# Patient Record
Sex: MALE | Race: BLACK OR AFRICAN AMERICAN | NOT HISPANIC OR LATINO | URBAN - METROPOLITAN AREA
[De-identification: names, ages, dates, MRNs, and addresses within clinical notes are randomized per-mention and may not be internally consistent; named-entity substitution may affect disease eponyms.]

---
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
## 2024-05-22 VITALS
HEART RATE: 125 BPM | TEMPERATURE: 99 F | RESPIRATION RATE: 18 BRPM | OXYGEN SATURATION: 99 % | DIASTOLIC BLOOD PRESSURE: 71 MMHG | HEIGHT: 71 IN | WEIGHT: 214.95 LBS | SYSTOLIC BLOOD PRESSURE: 119 MMHG

## 2024-05-22 LAB
AMPHET UR-MCNC: NEGATIVE — SIGNIFICANT CHANGE UP
ANION GAP SERPL CALC-SCNC: 13 MMOL/L — SIGNIFICANT CHANGE UP (ref 5–17)
APAP SERPL-MCNC: <5 UG/ML — LOW (ref 10–30)
APPEARANCE UR: CLEAR — SIGNIFICANT CHANGE UP
BACTERIA # UR AUTO: NEGATIVE /HPF — SIGNIFICANT CHANGE UP
BARBITURATES UR SCN-MCNC: NEGATIVE — SIGNIFICANT CHANGE UP
BASOPHILS # BLD AUTO: 0.03 K/UL — SIGNIFICANT CHANGE UP (ref 0–0.2)
BASOPHILS NFR BLD AUTO: 0.4 % — SIGNIFICANT CHANGE UP (ref 0–2)
BENZODIAZ UR-MCNC: NEGATIVE — SIGNIFICANT CHANGE UP
BILIRUB UR-MCNC: NEGATIVE — SIGNIFICANT CHANGE UP
BUN SERPL-MCNC: 14 MG/DL — SIGNIFICANT CHANGE UP (ref 7–23)
CALCIUM SERPL-MCNC: 9.7 MG/DL — SIGNIFICANT CHANGE UP (ref 8.4–10.5)
CAST: 0 /LPF — SIGNIFICANT CHANGE UP (ref 0–4)
CHLORIDE SERPL-SCNC: 102 MMOL/L — SIGNIFICANT CHANGE UP (ref 96–108)
CO2 SERPL-SCNC: 23 MMOL/L — SIGNIFICANT CHANGE UP (ref 22–31)
COCAINE METAB.OTHER UR-MCNC: NEGATIVE — SIGNIFICANT CHANGE UP
COLOR SPEC: YELLOW — SIGNIFICANT CHANGE UP
CREAT SERPL-MCNC: 0.99 MG/DL — SIGNIFICANT CHANGE UP (ref 0.5–1.3)
DIFF PNL FLD: NEGATIVE — SIGNIFICANT CHANGE UP
EGFR: 113 ML/MIN/1.73M2 — SIGNIFICANT CHANGE UP
EOSINOPHIL # BLD AUTO: 0.15 K/UL — SIGNIFICANT CHANGE UP (ref 0–0.5)
EOSINOPHIL NFR BLD AUTO: 2.2 % — SIGNIFICANT CHANGE UP (ref 0–6)
ETHANOL SERPL-MCNC: <10 MG/DL — SIGNIFICANT CHANGE UP (ref 0–10)
FLUAV AG NPH QL: SIGNIFICANT CHANGE UP
FLUBV AG NPH QL: SIGNIFICANT CHANGE UP
GLUCOSE SERPL-MCNC: 113 MG/DL — HIGH (ref 70–99)
GLUCOSE UR QL: NEGATIVE MG/DL — SIGNIFICANT CHANGE UP
HCT VFR BLD CALC: 42.5 % — SIGNIFICANT CHANGE UP (ref 39–50)
HGB BLD-MCNC: 14.2 G/DL — SIGNIFICANT CHANGE UP (ref 13–17)
IMM GRANULOCYTES NFR BLD AUTO: 0.4 % — SIGNIFICANT CHANGE UP (ref 0–0.9)
KETONES UR-MCNC: NEGATIVE MG/DL — SIGNIFICANT CHANGE UP
LEUKOCYTE ESTERASE UR-ACNC: ABNORMAL
LYMPHOCYTES # BLD AUTO: 1.8 K/UL — SIGNIFICANT CHANGE UP (ref 1–3.3)
LYMPHOCYTES # BLD AUTO: 26.6 % — SIGNIFICANT CHANGE UP (ref 13–44)
MCHC RBC-ENTMCNC: 27.1 PG — SIGNIFICANT CHANGE UP (ref 27–34)
MCHC RBC-ENTMCNC: 33.4 GM/DL — SIGNIFICANT CHANGE UP (ref 32–36)
MCV RBC AUTO: 81.1 FL — SIGNIFICANT CHANGE UP (ref 80–100)
METHADONE UR-MCNC: NEGATIVE — SIGNIFICANT CHANGE UP
MONOCYTES # BLD AUTO: 1.1 K/UL — HIGH (ref 0–0.9)
MONOCYTES NFR BLD AUTO: 16.3 % — HIGH (ref 2–14)
NEUTROPHILS # BLD AUTO: 3.65 K/UL — SIGNIFICANT CHANGE UP (ref 1.8–7.4)
NEUTROPHILS NFR BLD AUTO: 54.1 % — SIGNIFICANT CHANGE UP (ref 43–77)
NITRITE UR-MCNC: NEGATIVE — SIGNIFICANT CHANGE UP
NRBC # BLD: 0 /100 WBCS — SIGNIFICANT CHANGE UP (ref 0–0)
OPIATES UR-MCNC: NEGATIVE — SIGNIFICANT CHANGE UP
PCP SPEC-MCNC: SIGNIFICANT CHANGE UP
PCP UR-MCNC: NEGATIVE — SIGNIFICANT CHANGE UP
PH UR: 7 — SIGNIFICANT CHANGE UP (ref 5–8)
PLATELET # BLD AUTO: 215 K/UL — SIGNIFICANT CHANGE UP (ref 150–400)
POTASSIUM SERPL-MCNC: 4.3 MMOL/L — SIGNIFICANT CHANGE UP (ref 3.5–5.3)
POTASSIUM SERPL-SCNC: 4.3 MMOL/L — SIGNIFICANT CHANGE UP (ref 3.5–5.3)
PROT UR-MCNC: SIGNIFICANT CHANGE UP MG/DL
RBC # BLD: 5.24 M/UL — SIGNIFICANT CHANGE UP (ref 4.2–5.8)
RBC # FLD: 14.1 % — SIGNIFICANT CHANGE UP (ref 10.3–14.5)
RBC CASTS # UR COMP ASSIST: 1 /HPF — SIGNIFICANT CHANGE UP (ref 0–4)
RSV RNA NPH QL NAA+NON-PROBE: SIGNIFICANT CHANGE UP
SALICYLATES SERPL-MCNC: <0.3 MG/DL — LOW (ref 2.8–20)
SARS-COV-2 RNA SPEC QL NAA+PROBE: SIGNIFICANT CHANGE UP
SODIUM SERPL-SCNC: 138 MMOL/L — SIGNIFICANT CHANGE UP (ref 135–145)
SP GR SPEC: 1.03 — SIGNIFICANT CHANGE UP (ref 1–1.03)
SQUAMOUS # UR AUTO: 0 /HPF — SIGNIFICANT CHANGE UP (ref 0–5)
THC UR QL: NEGATIVE — SIGNIFICANT CHANGE UP
UROBILINOGEN FLD QL: 1 MG/DL — SIGNIFICANT CHANGE UP (ref 0.2–1)
WBC # BLD: 6.76 K/UL — SIGNIFICANT CHANGE UP (ref 3.8–10.5)
WBC # FLD AUTO: 6.76 K/UL — SIGNIFICANT CHANGE UP (ref 3.8–10.5)
WBC UR QL: 5 /HPF — SIGNIFICANT CHANGE UP (ref 0–5)

## 2024-05-22 PROCEDURE — 99285 EMERGENCY DEPT VISIT HI MDM: CPT

## 2024-05-22 NOTE — ED ADULT NURSE REASSESSMENT NOTE - NS ED NURSE REASSESS COMMENT FT1
Patient placed on 1:1 observation at all times in full view and at arm's length, including while the patient is in the bathroom, and whenever the patient leaves the unit for diagnostic testing and transfers.

## 2024-05-22 NOTE — ED ADULT NURSE NOTE - OBJECTIVE STATEMENT
Next appt 7-27-21. Pt presenting to ED with +SI without a plan x1 month, pt denies HI. Pt c/o paranoia, depression and is unable to sleep d/t SI. Pt denies alcohol/drug use, pt denies any PMH. Pt placed on 1:1 observation.

## 2024-05-23 ENCOUNTER — INPATIENT (INPATIENT)
Facility: HOSPITAL | Age: 19
LOS: 7 days | Discharge: HOME | End: 2024-05-31
Attending: PSYCHIATRY & NEUROLOGY | Admitting: PSYCHIATRY & NEUROLOGY
Payer: COMMERCIAL

## 2024-05-23 DIAGNOSIS — F43.20 ADJUSTMENT DISORDER, UNSPECIFIED: ICD-10-CM

## 2024-05-23 DIAGNOSIS — F29 UNSPECIFIED PSYCHOSIS NOT DUE TO A SUBSTANCE OR KNOWN PHYSIOLOGICAL CONDITION: ICD-10-CM

## 2024-05-23 DIAGNOSIS — F41.9 ANXIETY DISORDER, UNSPECIFIED: ICD-10-CM

## 2024-05-23 LAB — SARS-COV-2 RNA SPEC QL NAA+PROBE: SIGNIFICANT CHANGE UP

## 2024-05-23 PROCEDURE — 90792 PSYCH DIAG EVAL W/MED SRVCS: CPT | Mod: 95

## 2024-05-23 PROCEDURE — 99223 1ST HOSP IP/OBS HIGH 75: CPT

## 2024-05-23 RX ORDER — HALOPERIDOL DECANOATE 100 MG/ML
5 INJECTION INTRAMUSCULAR EVERY 6 HOURS
Refills: 0 | Status: DISCONTINUED | OUTPATIENT
Start: 2024-05-23 | End: 2024-05-31

## 2024-05-23 RX ORDER — RISPERIDONE 4 MG/1
1 TABLET ORAL AT BEDTIME
Refills: 0 | Status: DISCONTINUED | OUTPATIENT
Start: 2024-05-23 | End: 2024-05-31

## 2024-05-23 RX ORDER — DIPHENHYDRAMINE HCL 50 MG
50 CAPSULE ORAL EVERY 6 HOURS
Refills: 0 | Status: DISCONTINUED | OUTPATIENT
Start: 2024-05-23 | End: 2024-05-31

## 2024-05-23 RX ORDER — ACETAMINOPHEN 500 MG
650 TABLET ORAL EVERY 6 HOURS
Refills: 0 | Status: DISCONTINUED | OUTPATIENT
Start: 2024-05-23 | End: 2024-05-31

## 2024-05-23 NOTE — ED BEHAVIORAL HEALTH ASSESSMENT NOTE - DIFFERENTIAL
Adjustment Disorder, Generalized Anxiety Disorder, Primary psychosis, major depressive disorder, PTSD

## 2024-05-23 NOTE — ED BEHAVIORAL HEALTH ASSESSMENT NOTE - PSYCHIATRIC ISSUES AND PLAN (INCLUDE STANDING AND PRN MEDICATION)
Consider starting SSRI after getting diagnostic clarity; PRNS: Haldol 5/Ativan 2/Bendaryl 50 q6 hours po/IM for agitation/severe agitation

## 2024-05-23 NOTE — ED PROVIDER NOTE - OBJECTIVE STATEMENT
18M psych hx, c/o feeling paranoid. pt states ongoing for past month. states also having auditory hallucinations at night. no SI. no n/v. pt states he is supposed to take a medication, but cannot recall the name. denies drugs/alcohol. states has been admitted to psych facility in the past, however not here.

## 2024-05-23 NOTE — BH INPATIENT PSYCHIATRY ASSESSMENT NOTE - NSBHATTESTAPPBILLTIME_PSY_A_CORE
I attest my time as MAURA is greater than 50% of the total combined time spent on qualifying patient care activities. I have reviewed and verified the documentation.

## 2024-05-23 NOTE — BH INPATIENT PSYCHIATRY ASSESSMENT NOTE - NSBHASSESSSUMMFT_PSY_ALL_CORE
This is a 19y/o unemployed, single, undomiciled (staying in a drop in center in [facility])  male. No pertinent medical hx. Psychiatric hx of multiple hospitalizations at [facility] (9/16/19 - 9/1/19, 11/28/23 - 12/21/23, 12/27/23 - 12/31/23), most recently hospitalized January 2024. Diagnoses include PTSD, Anxiety, DMDD, MDD, Schizophrenia, ADHD. No hx of SA, no NSSIB. +Hx trauma, no substance use. Patient self-presented to [facility] ED with complaint of paranoid ideation and anxiety. Utox negative for all substances. Patient admitted to [facility] voluntary status.   He reports worsening paranoid ideation and anxiety over the last few months in context of medication non-compliance and psychosocial stressors. He has lack of support in the community and recently transitioned from a residential program to a drop in center. Will intend to start Risperdal and gauge need for antidepressant.      Risperdal 1mg qhs

## 2024-05-23 NOTE — BH SOCIAL WORK INITIAL PSYCHOSOCIAL EVALUATION - NSBHHOUSECONTACTFT_PSY_ALL_CORE
"[facility]"  [address] [address]  Clinical Supervisor: Melissa Kelly (Work Cell: [phone])  Main Phone: [phone]

## 2024-05-23 NOTE — ED BEHAVIORAL HEALTH ASSESSMENT NOTE - DESCRIPTION
see HPI denies Pt calm and cooperative, in good behavioral control     T(C): 36.7 (05-23-24 @ 03:33)  T(F): 98 (05-23-24 @ 03:33), Max: 98.6 (05-22-24 @ 20:55)  HR: 96 (05-23-24 @ 04:07) (96 - 125)  BP: 115/75 (05-23-24 @ 03:33) (110/72 - 119/71)  RR:  (16 - 18)  SpO2:  (97% - 99%)  Wt(kg): --

## 2024-05-23 NOTE — ED PROVIDER NOTE - CLINICAL SUMMARY MEDICAL DECISION MAKING FREE TEXT BOX
c/o feeling paranoid, AH, no SI currently, quiet, slow to answer  -check labs  -ekg  -1:1  -psych c/s c/o feeling paranoid, AH, no SI currently, quiet, slow to answer. initially tachycardic in triage, improved without intervention. no chest pain, no SOB. afebrile  -check labs  -ekg  -1:1  -psych c/s

## 2024-05-23 NOTE — BH SOCIAL WORK INITIAL PSYCHOSOCIAL EVALUATION - NSBHCHILDEVENTS_PSY_ALL_CORE
The patient reports that he was removed from his parents care during his childhood, and resided in foster care group homes. His parents presently reside in [place] with his younger sisters./Out of home placement

## 2024-05-23 NOTE — BH SOCIAL WORK INITIAL PSYCHOSOCIAL EVALUATION - NSBHCOMMCURRENT_PSY_ALL_CORE
The patient reports that he has a , Teresa, who works at the [facility] that he has been sleeping at the past 3 months. This SW called his Drop-In Center on 5/23/24, and spoke with the Supervisor for Clinical Services, Melissa Kelly (Phone: [phone]), who stated Teresa is their Financial Counselor. She reports the patient opens up to Teresa more than to their SW team. No complaints

## 2024-05-23 NOTE — BH INPATIENT PSYCHIATRY ASSESSMENT NOTE - NSBHCHARTREVIEWVS_PSY_A_CORE FT
Vital Signs Last 24 Hrs  T(C): 37.4 (05-23-24 @ 05:10), Max: 37.4 (05-23-24 @ 05:10)  T(F): 99.4 (05-23-24 @ 05:10), Max: 99.4 (05-23-24 @ 05:10)  HR: 100 (05-23-24 @ 05:10) (96 - 125)  BP: 120/76 (05-23-24 @ 05:10) (110/72 - 120/76)  BP(mean): --  RR: 18 (05-23-24 @ 05:10) (16 - 18)  SpO2: 97% (05-23-24 @ 05:10) (97% - 99%)

## 2024-05-23 NOTE — ED BEHAVIORAL HEALTH ASSESSMENT NOTE - SUMMARY
Jose Alberto is a 18 year old, currently in foster care, multiple past IPP (last known 1/5 - 1//2024 for unspecified psychosis at an [facility] of state hospital), multiple hospitalizations at [facility] (12/27/23 - 12/31/23; 11/28/23 - 12/21/23; 9/16/19 - 9/1/19), per PSYCKES diagnoses of PTSD, anxiety, unspecified psychosis, unspecified anxiety, DMDD, major depressive disorder, schizophrenia, ADHD,  denies legal history, denies history of suicide attempts, history of trauma, no substance use, presented to the ED with paranoia and anxiety.    On evaluation the patient is anxious appearing, expresses anxiety, paranoia intermittent suicidal ideation without intent or plan, depression, and poor sleep. Pt is a poor historian.  He is dysphoric appearing and also has a history or trauma. He is reportedly in foster care without placement and has been sleeping on a couch in an office in [place] for two months. Unable to contact any collateral as patient does not have information. Given patient's ongoing symptoms, lack of social support, lack of current psychiatric treatment, multiple past IPP, and instability to safety plan, pt would benefit from inpt hospitalization for safety and stabilization and to restart psychiatric medications. Differential diagnosis is broad, and it would be beneficial to obtain more collateral or records from [facility] where patient was hospitalized numerous times for treatment purposes.  Pt agreeable with plan and will be admitted to [facility] IPP on 9.13. No CO needed at this time.

## 2024-05-23 NOTE — ED BEHAVIORAL HEALTH ASSESSMENT NOTE - HPI (INCLUDE ILLNESS QUALITY, SEVERITY, DURATION, TIMING, CONTEXT, MODIFYING FACTORS, ASSOCIATED SIGNS AND SYMPTOMS)
Jose Alberto is a 18 year old, currently in foster care, multiple past IPP (last known 1/5 - 1//2024 for unspecified psychosis at an Out of state hospital, Jose Alberto is a 18 year old, currently in foster care, multiple past IPP (last known 1/5 - 1//2024 for unspecified psychosis at an Out of state hospital), multiple hospitalizations at [facility] (12/27/23 - 12/31/23; 11/28/23 - 12/21/23; 9/16/19 - 9/1/19), per PSYCKES diagnoses of PTSD< anxiety, unspecified psychosis, unspecified anxiety, DMDD, major depressive disorder, schizophrenia, ADHD,  denies legal history, denies history of suicide attempts, history of trauma, no substance use, presented to the ED with paranoia and anxiety.     The patient is a poor historian. He is calm and cooperative on evaluation. He reports that he came to the ED because he has been feeling paranoid and restless. He states that he feels like he has to be on the defensive and people are watching him and looking at him. He states that he also has bad anxiety and was on the train earlier today and experienced SOB and restlessness. He was vague regarding his social history, states that he has been in foster care for a long time; was living in a group home in [place] (and had received some outpatient treatment at [facility]), but was being bullied so his  Dev is looking for new placement. He states that he was told to sleep at a drop in center in [place] ([address] [place] [address]) until his new placement is possible. He did not have a phone number for Dev but provided his email ([email or web address]). States he has been sleeping adria a couch at the drop n center, because there is no bed and it is not an actual shelter. States he has heriberto doing this for 1-2 months. He reports history of trauma related to his childhood, and would not discuss this or the reasons why he was removed from his parents care. He stated that his parents live in [place] with his biological younger sisters. Reports feeling depressed, he is not on his medications and was previously on VPA and Risperdal. He states that he had suicidal ideation earlier when he was feeling paranoid an anxious. States that his heart was racing and it comes and goes. He denies current SI/HI. He states that he is having difficulty sleeping and feels restless. He denies auditory/visual hallucinations. Denies access to guns. Denies history of arrests/violence. States that he has no social supports and is not in touch with family. He does not receive SSI or benefits and has been getting food from drop in center. He denies substance abuse. Pt graduated from 11th grade, working on his GED. Allergies to peanuts and shellfish. Pt requesting inpatient admission, wants to restart medications.
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
## 2024-05-23 NOTE — ED BEHAVIORAL HEALTH ASSESSMENT NOTE - OTHER
In foster care, currently staying at a drop in center in  [place] till new placement paranoia, suicidal ideation, depression reports paranoia

## 2024-05-23 NOTE — BH PATIENT PROFILE - NSPROMEDSPUMP_GEN_A_NUR
none
Patient states "I need to see a psychiatrist, I had jaw surgery 4 months ago that was botched. I've been having panic attacks. Also I have been thinking about killing myself". Denies Homicidal ideations, denies plan for SI

## 2024-05-23 NOTE — ED BEHAVIORAL HEALTH ASSESSMENT NOTE - RISK ASSESSMENT
RF: previous IPP, recent suicidal ideation, depressed mood, no social supports, unstable domicile, history of trauma, paranoia    PF: help seeking, calm and cooperative, denies current SI/HI, no history of violence, no access to guns

## 2024-05-23 NOTE — BH INPATIENT PSYCHIATRY ASSESSMENT NOTE - NSBHMETABOLIC_PSY_ALL_CORE_FT
BMI: BMI (kg/m2): 30 (05-23-24 @ 05:10)  HbA1c:   Glucose:   BP: 120/76 (05-23-24 @ 05:10) (110/72 - 120/76)Vital Signs Last 24 Hrs  T(C): 37.4 (05-23-24 @ 05:10), Max: 37.4 (05-23-24 @ 05:10)  T(F): 99.4 (05-23-24 @ 05:10), Max: 99.4 (05-23-24 @ 05:10)  HR: 100 (05-23-24 @ 05:10) (96 - 125)  BP: 120/76 (05-23-24 @ 05:10) (110/72 - 120/76)  BP(mean): --  RR: 18 (05-23-24 @ 05:10) (16 - 18)  SpO2: 97% (05-23-24 @ 05:10) (97% - 99%)      Lipid Panel:

## 2024-05-23 NOTE — BH SOCIAL WORK INITIAL PSYCHOSOCIAL EVALUATION - SAFE PLACE TO LIVE - DETAILS
While the patient reports that he can return to his Drop-In Center, he stated that he has been sleeping on their couch and not a bed. He is currently on awaiting list for a crisis group home placement.

## 2024-05-23 NOTE — ED BEHAVIORAL HEALTH NOTE - BEHAVIORAL HEALTH NOTE
Attempted to call patient's emergency contact, (#: [phone]), however was unable to reach anyone of substantial collateral.

## 2024-05-23 NOTE — BH SOCIAL WORK INITIAL PSYCHOSOCIAL EVALUATION - NSBHABUSECOMMENTFT_PSY_ALL_CORE
The patient endorses a trauma history, but he did not wish to go into detail. The patient was removed from his parents in childhood and placed into foster care.

## 2024-05-23 NOTE — ED BEHAVIORAL HEALTH ASSESSMENT NOTE - DEPENDENTS
[facility] Social Determinants Of Health (Sdoh) Screening Questionnaire    Question 4/9/2024  3:22 PM CDT - Filed by Patient   How hard is it for you to pay for the very basics like food, housing, medical care, and heating? Not hard at all   Within the past 12 months, you worried that your food would run out before you got the money to buy more. Never true   Within the past 12 months, the food you bought just didn’t last and you didn’t have money to get more. Never true   In the past 12 months, has lack of transportation kept you from meetings, work, or from getting things needed for daily living? No   In the last 12 months, was there a time when you did not have a steady place to sleep or slept in a shelter (including now)? No   Would you like resources for any of these topics? No, thank you      None known

## 2024-05-23 NOTE — BH SOCIAL WORK INITIAL PSYCHOSOCIAL EVALUATION - NSBHTREATHX_PSY_ALL_CORE
The patient stated that he is not currently linked to outpatient mental health treatment, and he was last connected to mental health services through at his group home (he aged out of that placement approximately 3 months ago per the patient)./No

## 2024-05-23 NOTE — BH INPATIENT PSYCHIATRY ASSESSMENT NOTE - HPI (INCLUDE ILLNESS QUALITY, SEVERITY, DURATION, TIMING, CONTEXT, MODIFYING FACTORS, ASSOCIATED SIGNS AND SYMPTOMS)
This is a 17y/o unemployed, single, undomiciled (staying in a drop in center in [facility])  male. No pertinent medical hx. Psychiatric hx of multiple hospitalizations at [facility] (9/16/19 - 9/1/19, 11/28/23 - 12/21/23, 12/27/23 - 12/31/23), most recently hospitalized January 2024. Diagnoses include PTSD, Anxiety, DMDD, MDD, Schizophrenia, ADHD. No hx of SA, no NSSIB. +Hx trauma, no substance use. Patient self-presented to [facility] ED with complaint of paranoid ideation and anxiety. Utox negative for all substances. Patient admitted to [facility] voluntary status.     Patient states that he had been in foster care for years until aging out of his residential program 4 months ago. They have been unable to get him into housing directly due to a lack of beds and he has been working with his SW Dev to find him placement. He has been staying in a drop in center x3 months. At the Center he has also been going to classes for his GED. He states that he has not taken his psychiatric medications in months since he was released from his residential program, but believes that he was taking risperdal, depakote and another medication most recently. He states that he has been feeling paranoid and anxious about social situations and yesterday when he was leaving the [place] to return to his drop in in [place] he decided to go to the hospital. He denies having SI, but endorses having intrusive thoughts at times. Denies thoughts of harming others. Denies avh, but reports having times when he feels disorganized and has difficulty understanding what is going on around him. He currently denies feelings of confusion or disorganization. He states that he was diagnosed with Schizophrenia this year at his most recent hospitalization.    Sleep has been poor due to sleeping on chairs, sofa at the drop in [place]. Appetite has been unremarkable. No drug/etoh use. Vapes nicotine daily.       Per ED report:  The patient is a poor historian. He is calm and cooperative on evaluation. He reports that he came to the ED because he has been feeling paranoid and restless. He states that he feels like he has to be on the defensive and people are watching him and looking at him. He states that he also has bad anxiety and was on the train earlier today and experienced SOB and restlessness. He was vague regarding his social history, states that he has been in foster care for a long time; was living in a group home in [place] (and had received some outpatient treatment at [facility]), but was being bullied so his  Dev is looking for new placement. He states that he was told to sleep at a drop in center in [place] ([facility]) until his new placement is possible. He did not have a phone number for Dev but provided his email ([email or web address]). States he has been sleeping adria a couch at the drop n center, because there is no bed and it is not an actual shelter. States he has heriberto doing this for 1-2 months. He reports history of trauma related to his childhood, and would not discuss this or the reasons why he was removed from his parents care. He stated that his parents live in [place] with his biological younger sisters. Reports feeling depressed, he is not on his medications and was previously on VPA and Risperdal. He states that he had suicidal ideation earlier when he was feeling paranoid an anxious. States that his heart was racing and it comes and goes. He denies current SI/HI. He states that he is having difficulty sleeping and feels restless. He denies auditory/visual hallucinations. Denies access to guns. Denies history of arrests/violence. States that he has no social supports and is not in touch with family. He does not receive SSI or benefits and has been getting food from [facility] in [place]. He denies substance abuse. Pt graduated from 11th grade, working on his GED. Allergies to peanuts and shellfish. Pt requesting inpatient admission, wants to restart medications.

## 2024-05-24 LAB
A1C WITH ESTIMATED AVERAGE GLUCOSE RESULT: 5.4 % — SIGNIFICANT CHANGE UP (ref 4–5.6)
CHOLEST SERPL-MCNC: 271 MG/DL — HIGH
ESTIMATED AVERAGE GLUCOSE: 108 MG/DL — SIGNIFICANT CHANGE UP (ref 68–114)
HDLC SERPL-MCNC: 67 MG/DL — SIGNIFICANT CHANGE UP
LIPID PNL WITH DIRECT LDL SERPL: 188 MG/DL — HIGH
NON HDL CHOLESTEROL: 204 MG/DL — HIGH
TRIGL SERPL-MCNC: 94 MG/DL — SIGNIFICANT CHANGE UP
TSH SERPL-MCNC: 2.02 UIU/ML — SIGNIFICANT CHANGE UP (ref 0.27–4.2)

## 2024-05-24 PROCEDURE — 99232 SBSQ HOSP IP/OBS MODERATE 35: CPT

## 2024-05-24 RX ORDER — SERTRALINE 25 MG/1
50 TABLET, FILM COATED ORAL DAILY
Refills: 0 | Status: DISCONTINUED | OUTPATIENT
Start: 2024-05-25 | End: 2024-05-31

## 2024-05-24 NOTE — BH INPATIENT PSYCHIATRY PROGRESS NOTE - NSBHFUPINTERVALHXFT_PSY_A_CORE
Patient seen in his room resting on approach. He is cooperative on approach, states that he has been adjusting well to the unit, reports that his anxiety has significantly decreased. He sometimes feels quite anxious in social situations and feels that that was been worsening most recently. He denies symptoms of depression, but does have negative intrusive thoughts occasionally related to his psychosocial stressors. Tolerating risperdal well without issue. Is amenable to zoloft addition. Patient is well related, appropriate, poor to fair eye contact, not grossly psychotic.   No acute medical concerns or issues. No si/hi/avh or PI.

## 2024-05-25 PROCEDURE — 99231 SBSQ HOSP IP/OBS SF/LOW 25: CPT

## 2024-05-25 RX ADMIN — SERTRALINE 50 MILLIGRAM(S): 25 TABLET, FILM COATED ORAL at 10:04

## 2024-05-25 NOTE — BH INPATIENT PSYCHIATRY PROGRESS NOTE - NSBHFUPINTERVALHXFT_PSY_A_CORE
Patient is observed to be watching television. He was evaluated in the dining room and was calm and cooperative with assessment. He reports feeling okay and denies any anxiety or paranoia. He denies any AH, VH, SI, and HI. He denies any conflicts with anyone on the unit. He denies any side effects from his medications, including drowsiness. He reports good appetite and sleep. He denies any acute concerns.

## 2024-05-26 PROCEDURE — 99231 SBSQ HOSP IP/OBS SF/LOW 25: CPT

## 2024-05-26 RX ADMIN — RISPERIDONE 1 MILLIGRAM(S): 4 TABLET ORAL at 22:45

## 2024-05-26 RX ADMIN — SERTRALINE 50 MILLIGRAM(S): 25 TABLET, FILM COATED ORAL at 10:28

## 2024-05-26 RX ADMIN — Medication 650 MILLIGRAM(S): at 23:45

## 2024-05-26 RX ADMIN — Medication 650 MILLIGRAM(S): at 22:45

## 2024-05-26 NOTE — BH INPATIENT PSYCHIATRY PROGRESS NOTE - NSBHFUPINTERVALHXFT_PSY_A_CORE
On evaluation, patient is observed to be watching television. He states that he is doing well,  On evaluation, patient is observed to be watching television. He states that he is doing well and that today is better than yesterday. He reports resolution of anxiety, depressed mood, and SI. He reports eating and sleeping well. He denies any acute concerns and side effects from his medications.

## 2024-05-27 PROCEDURE — 99231 SBSQ HOSP IP/OBS SF/LOW 25: CPT

## 2024-05-27 RX ADMIN — RISPERIDONE 1 MILLIGRAM(S): 4 TABLET ORAL at 21:24

## 2024-05-27 RX ADMIN — Medication 50 MILLIGRAM(S): at 21:24

## 2024-05-27 RX ADMIN — SERTRALINE 50 MILLIGRAM(S): 25 TABLET, FILM COATED ORAL at 10:06

## 2024-05-27 NOTE — BH INPATIENT PSYCHIATRY PROGRESS NOTE - NSBHFUPINTERVALHXFT_PSY_A_CORE
The was found socializing in the dining area and interviewed in a more private area. He reported doing well overall. He denied any anxiety symptoms or specific fears or concerns while on the unit. He reports some neck stiffness that he stated may have been happening prior to starting risperdal. He denies any muscle stiffness and no noted torticollis on exam. He also reported good sleep, with a good mood, and no other acute complaints.

## 2024-05-28 PROCEDURE — 99232 SBSQ HOSP IP/OBS MODERATE 35: CPT

## 2024-05-28 RX ADMIN — SERTRALINE 50 MILLIGRAM(S): 25 TABLET, FILM COATED ORAL at 14:04

## 2024-05-28 RX ADMIN — RISPERIDONE 1 MILLIGRAM(S): 4 TABLET ORAL at 21:16

## 2024-05-28 NOTE — BH INPATIENT PSYCHIATRY PROGRESS NOTE - NSBHFUPINTERVALHXFT_PSY_A_CORE
Pt seen, chart reviewed.  Pt alongside PA Student, the pt is seated in the Common Area, dressed in hospital clothing, calm and cooperative. Pt reports his mood as "good." He reports "good" sleep last PM. Pt denies SI/HI/AH/VH. No side effects to medications reported or observed. Pt does not wish to make any changes to his medications at this time and inquires about discharge.

## 2024-05-29 PROCEDURE — 99231 SBSQ HOSP IP/OBS SF/LOW 25: CPT

## 2024-05-29 RX ADMIN — RISPERIDONE 1 MILLIGRAM(S): 4 TABLET ORAL at 20:54

## 2024-05-29 RX ADMIN — SERTRALINE 50 MILLIGRAM(S): 25 TABLET, FILM COATED ORAL at 11:14

## 2024-05-29 RX ADMIN — Medication 50 MILLIGRAM(S): at 20:54

## 2024-05-29 NOTE — BH TREATMENT PLAN - NSTXDEPRESINTERRN_PSY_ALL_CORE
patient was encouraged to verbalize feelings to staff ,comply with meds ,join groups.
encourage pt to verbalize feelings and adhere to med regimen

## 2024-05-29 NOTE — BH INPATIENT PSYCHIATRY PROGRESS NOTE - NSBHFUPINTERVALHXFT_PSY_A_CORE
Patient seen with ROBERT this morning in conference room, noted to be pleasant, shy, polite. He reports having good mood and wanting to discuss discharge. He states that his mood is good, denies sxs of anxiety or depression. Feels that medications have been helping and also has been attending groups and socializing with peers.   Denies si/hi/avh or PI. No acute medical concerns or issues. Fair sleep and appetite.

## 2024-05-29 NOTE — BH TREATMENT PLAN - ANXIETY/PANIC/FEAR NURSING INTERVENTIONS/RECOMMENDATIONS
patient was encouraged to verbalize feelings to staff ,comply with meds.
patient was encouraged to verbalize feelings to staff ,comply with meds and join groups.

## 2024-05-30 PROCEDURE — 99231 SBSQ HOSP IP/OBS SF/LOW 25: CPT

## 2024-05-30 RX ADMIN — RISPERIDONE 1 MILLIGRAM(S): 4 TABLET ORAL at 21:17

## 2024-05-30 RX ADMIN — Medication 50 MILLIGRAM(S): at 21:17

## 2024-05-30 RX ADMIN — SERTRALINE 50 MILLIGRAM(S): 25 TABLET, FILM COATED ORAL at 10:33

## 2024-05-30 NOTE — BH INPATIENT PSYCHIATRY PROGRESS NOTE - NSBHCHARTREVIEWVS_PSY_A_CORE FT
Vital Signs Last 24 Hrs  T(C): 37.3 (05-29-24 @ 07:50), Max: 37.4 (05-28-24 @ 17:28)  T(F): 99.2 (05-29-24 @ 07:50), Max: 99.4 (05-28-24 @ 17:28)  HR: 94 (05-29-24 @ 07:50) (83 - 94)  BP: 139/83 (05-29-24 @ 07:50) (100/75 - 139/83)  BP(mean): --  RR: --  SpO2: 100% (05-29-24 @ 07:50) (100% - 100%)    
Vital Signs Last 24 Hrs  T(C): 36.8 (05-26-24 @ 17:06), Max: 36.8 (05-26-24 @ 17:06)  T(F): 98.3 (05-26-24 @ 17:06), Max: 98.3 (05-26-24 @ 17:06)  HR: 87 (05-26-24 @ 17:06) (87 - 98)  BP: 129/82 (05-26-24 @ 17:06) (113/74 - 129/82)  BP(mean): --  RR: 17 (05-26-24 @ 17:06) (17 - 18)  SpO2: 97% (05-26-24 @ 17:06) (97% - 99%)    
Vital Signs Last 24 Hrs  T(C): 36.5 (05-25-24 @ 08:36), Max: 37.2 (05-24-24 @ 19:35)  T(F): 97.7 (05-25-24 @ 08:36), Max: 99 (05-24-24 @ 19:35)  HR: 106 (05-25-24 @ 08:36) (97 - 106)  BP: 116/84 (05-25-24 @ 08:36) (116/84 - 146/84)  BP(mean): --  RR: 18 (05-24-24 @ 19:35) (18 - 18)  SpO2: 99% (05-25-24 @ 08:36) (96% - 99%)    
Vital Signs Last 24 Hrs  T(C): 37.1 (05-30-24 @ 10:10), Max: 37.2 (05-29-24 @ 17:19)  T(F): 98.7 (05-30-24 @ 10:10), Max: 99 (05-29-24 @ 17:19)  HR: 103 (05-30-24 @ 10:10) (93 - 103)  BP: 134/84 (05-30-24 @ 10:10) (134/84 - 140/90)  BP(mean): --  RR: 17 (05-29-24 @ 17:19) (17 - 17)  SpO2: 98% (05-30-24 @ 10:10) (96% - 98%)    
Vital Signs Last 24 Hrs  T(C): 36.4 (05-27-24 @ 18:13), Max: 36.5 (05-27-24 @ 08:28)  T(F): 97.6 (05-27-24 @ 18:13), Max: 97.7 (05-27-24 @ 08:28)  HR: 103 (05-27-24 @ 18:13) (99 - 103)  BP: 138/77 (05-27-24 @ 18:13) (121/81 - 138/77)  BP(mean): --  RR: --  SpO2: 97% (05-27-24 @ 18:13) (96% - 97%)    
Vital Signs Last 24 Hrs  T(C): 37.2 (05-28-24 @ 09:49), Max: 37.2 (05-28-24 @ 09:49)  T(F): 99 (05-28-24 @ 09:49), Max: 99 (05-28-24 @ 09:49)  HR: 92 (05-28-24 @ 09:49) (92 - 103)  BP: 115/78 (05-28-24 @ 09:49) (115/78 - 138/77)  BP(mean): --  RR: --  SpO2: 97% (05-28-24 @ 09:49) (97% - 97%)    
Vital Signs Last 24 Hrs  T(C): 37.2 (05-24-24 @ 08:18), Max: 37.3 (05-23-24 @ 17:33)  T(F): 98.9 (05-24-24 @ 08:18), Max: 99.2 (05-23-24 @ 17:33)  HR: 92 (05-24-24 @ 08:18) (88 - 92)  BP: 128/88 (05-24-24 @ 08:18) (104/63 - 128/88)  BP(mean): --  RR: 18 (05-24-24 @ 08:18) (18 - 18)  SpO2: 97% (05-24-24 @ 08:18) (96% - 97%)

## 2024-05-30 NOTE — BH INPATIENT PSYCHIATRY PROGRESS NOTE - NSTXDEPRESINTERMD_PSY_ALL_CORE
psychopharmacology x15 minutes

## 2024-05-30 NOTE — BH INPATIENT PSYCHIATRY PROGRESS NOTE - NSBHATTESTTYPEVISIT_PSY_A_CORE
Attending Only
On-site Attending supervising MAURA (99XXX codes)
Attending Only
On-site Attending supervising MAURA (99XXX codes)
Attending Only
On-site Attending supervising MAURA (99XXX codes)
Attending Only

## 2024-05-30 NOTE — BH INPATIENT PSYCHIATRY PROGRESS NOTE - NSBHMETABOLIC_PSY_ALL_CORE_FT
BMI: BMI (kg/m2): 30 (05-23-24 @ 05:10)  HbA1c: A1C with Estimated Average Glucose Result: 5.4 % (05-24-24 @ 05:30)    Glucose:   BP: 128/88 (05-24-24 @ 08:18) (104/63 - 128/88)Vital Signs Last 24 Hrs  T(C): 37.2 (05-24-24 @ 08:18), Max: 37.3 (05-23-24 @ 17:33)  T(F): 98.9 (05-24-24 @ 08:18), Max: 99.2 (05-23-24 @ 17:33)  HR: 92 (05-24-24 @ 08:18) (88 - 92)  BP: 128/88 (05-24-24 @ 08:18) (104/63 - 128/88)  BP(mean): --  RR: 18 (05-24-24 @ 08:18) (18 - 18)  SpO2: 97% (05-24-24 @ 08:18) (96% - 97%)      Lipid Panel: Date/Time: 05-24-24 @ 05:30  Cholesterol, Serum: 271  LDL Cholesterol Calculated: 188  HDL Cholesterol, Serum: 67  Total Cholesterol/HDL Ration Measurement: --  Triglycerides, Serum: 94  
98.8
BMI: BMI (kg/m2): 30 (05-23-24 @ 05:10)  HbA1c: A1C with Estimated Average Glucose Result: 5.4 % (05-24-24 @ 05:30)    Glucose:   BP: 115/78 (05-28-24 @ 09:49) (113/74 - 154/78)Vital Signs Last 24 Hrs  T(C): 37.2 (05-28-24 @ 09:49), Max: 37.2 (05-28-24 @ 09:49)  T(F): 99 (05-28-24 @ 09:49), Max: 99 (05-28-24 @ 09:49)  HR: 92 (05-28-24 @ 09:49) (92 - 103)  BP: 115/78 (05-28-24 @ 09:49) (115/78 - 138/77)  BP(mean): --  RR: --  SpO2: 97% (05-28-24 @ 09:49) (97% - 97%)      Lipid Panel: Date/Time: 05-24-24 @ 05:30  Cholesterol, Serum: 271  LDL Cholesterol Calculated: 188  HDL Cholesterol, Serum: 67  Total Cholesterol/HDL Ration Measurement: --  Triglycerides, Serum: 94  
BMI: BMI (kg/m2): 30 (05-23-24 @ 05:10)  HbA1c: A1C with Estimated Average Glucose Result: 5.4 % (05-24-24 @ 05:30)    Glucose:   BP: 116/84 (05-25-24 @ 08:36) (104/63 - 146/84)Vital Signs Last 24 Hrs  T(C): 36.5 (05-25-24 @ 08:36), Max: 37.2 (05-24-24 @ 19:35)  T(F): 97.7 (05-25-24 @ 08:36), Max: 99 (05-24-24 @ 19:35)  HR: 106 (05-25-24 @ 08:36) (97 - 106)  BP: 116/84 (05-25-24 @ 08:36) (116/84 - 146/84)  BP(mean): --  RR: 18 (05-24-24 @ 19:35) (18 - 18)  SpO2: 99% (05-25-24 @ 08:36) (96% - 99%)      Lipid Panel: Date/Time: 05-24-24 @ 05:30  Cholesterol, Serum: 271  LDL Cholesterol Calculated: 188  HDL Cholesterol, Serum: 67  Total Cholesterol/HDL Ration Measurement: --  Triglycerides, Serum: 94  
BMI: BMI (kg/m2): 30 (05-23-24 @ 05:10)  HbA1c: A1C with Estimated Average Glucose Result: 5.4 % (05-24-24 @ 05:30)    Glucose:   BP: 129/82 (05-26-24 @ 17:06) (113/74 - 154/78)Vital Signs Last 24 Hrs  T(C): 36.8 (05-26-24 @ 17:06), Max: 36.8 (05-26-24 @ 17:06)  T(F): 98.3 (05-26-24 @ 17:06), Max: 98.3 (05-26-24 @ 17:06)  HR: 87 (05-26-24 @ 17:06) (87 - 98)  BP: 129/82 (05-26-24 @ 17:06) (113/74 - 129/82)  BP(mean): --  RR: 17 (05-26-24 @ 17:06) (17 - 18)  SpO2: 97% (05-26-24 @ 17:06) (97% - 99%)      Lipid Panel: Date/Time: 05-24-24 @ 05:30  Cholesterol, Serum: 271  LDL Cholesterol Calculated: 188  HDL Cholesterol, Serum: 67  Total Cholesterol/HDL Ration Measurement: --  Triglycerides, Serum: 94  
BMI: BMI (kg/m2): 30 (05-23-24 @ 05:10)  HbA1c: A1C with Estimated Average Glucose Result: 5.4 % (05-24-24 @ 05:30)    Glucose:   BP: 138/77 (05-27-24 @ 18:13) (113/74 - 154/78)Vital Signs Last 24 Hrs  T(C): 36.4 (05-27-24 @ 18:13), Max: 36.5 (05-27-24 @ 08:28)  T(F): 97.6 (05-27-24 @ 18:13), Max: 97.7 (05-27-24 @ 08:28)  HR: 103 (05-27-24 @ 18:13) (99 - 103)  BP: 138/77 (05-27-24 @ 18:13) (121/81 - 138/77)  BP(mean): --  RR: --  SpO2: 97% (05-27-24 @ 18:13) (96% - 97%)      Lipid Panel: Date/Time: 05-24-24 @ 05:30  Cholesterol, Serum: 271  LDL Cholesterol Calculated: 188  HDL Cholesterol, Serum: 67  Total Cholesterol/HDL Ration Measurement: --  Triglycerides, Serum: 94  
BMI: BMI (kg/m2): 30 (05-23-24 @ 05:10)  HbA1c: A1C with Estimated Average Glucose Result: 5.4 % (05-24-24 @ 05:30)    Glucose:   BP: 134/84 (05-30-24 @ 10:10) (100/75 - 140/90)Vital Signs Last 24 Hrs  T(C): 37.1 (05-30-24 @ 10:10), Max: 37.2 (05-29-24 @ 17:19)  T(F): 98.7 (05-30-24 @ 10:10), Max: 99 (05-29-24 @ 17:19)  HR: 103 (05-30-24 @ 10:10) (93 - 103)  BP: 134/84 (05-30-24 @ 10:10) (134/84 - 140/90)  BP(mean): --  RR: 17 (05-29-24 @ 17:19) (17 - 17)  SpO2: 98% (05-30-24 @ 10:10) (96% - 98%)      Lipid Panel: Date/Time: 05-24-24 @ 05:30  Cholesterol, Serum: 271  LDL Cholesterol Calculated: 188  HDL Cholesterol, Serum: 67  Total Cholesterol/HDL Ration Measurement: --  Triglycerides, Serum: 94  
BMI: BMI (kg/m2): 30 (05-23-24 @ 05:10)  HbA1c: A1C with Estimated Average Glucose Result: 5.4 % (05-24-24 @ 05:30)    Glucose:   BP: 139/83 (05-29-24 @ 07:50) (100/75 - 139/83)Vital Signs Last 24 Hrs  T(C): 37.3 (05-29-24 @ 07:50), Max: 37.4 (05-28-24 @ 17:28)  T(F): 99.2 (05-29-24 @ 07:50), Max: 99.4 (05-28-24 @ 17:28)  HR: 94 (05-29-24 @ 07:50) (83 - 94)  BP: 139/83 (05-29-24 @ 07:50) (100/75 - 139/83)  BP(mean): --  RR: --  SpO2: 100% (05-29-24 @ 07:50) (100% - 100%)      Lipid Panel: Date/Time: 05-24-24 @ 05:30  Cholesterol, Serum: 271  LDL Cholesterol Calculated: 188  HDL Cholesterol, Serum: 67  Total Cholesterol/HDL Ration Measurement: --  Triglycerides, Serum: 94

## 2024-05-30 NOTE — BH INPATIENT PSYCHIATRY PROGRESS NOTE - NSBHATTESTBILLING_PSY_A_CORE
66698-Nqhunyrufw OBS or IP - low complexity OR 25-34 mins
91756-Bcbudkbfkg OBS or IP - low complexity OR 25-34 mins
79045-Dtggigriue OBS or IP - moderate complexity OR 35-49 mins
58783-Jgbnrmxpth OBS or IP - low complexity OR 25-34 mins
44549-Njdrvditrm OBS or IP - low complexity OR 25-34 mins
67927-Mlxvlnrpiv OBS or IP - low complexity OR 25-34 mins
50022-Gbxknsatcj OBS or IP - moderate complexity OR 35-49 mins

## 2024-05-30 NOTE — BH INPATIENT PSYCHIATRY PROGRESS NOTE - NSBHASSESSSUMMFT_PSY_ALL_CORE
Patient is a 17y/o unemployed, single, undomiciled (staying in a drop in center in [facility])  male, with no PMH, PPH of PTSD, LAMBERT, DMDD, MDD, schizophrenia, and ADHD, with multiple hospitalizations at [facility] (9/16/19 - 9/1/19, 11/28/23 - 12/21/23, 12/27/23 - 12/31/23), most recently hospitalized January 2024, no hx of SA, no NSSIB, +Hx trauma, no substance use, who was admitted on voluntary status on 5/23/24 for paranoia and anxiety. He self-referred to the ED reporting worsening paranoid ideation and anxiety over the last few months in context of medication non-compliance and psychosocial stressors, including lack of support in the community and recent transition from a residential program to a drop in center.     On evaluation, patient is calm and cooperative. He denies any acute concerns and reports that his symptoms of paranoia and anxiety have resolved. Although patient's rapid resolutions of symptoms are likely secondary to temporary removal of psychosocial stressors in the inpatient setting, patient does have an extensive psychiatric history with multiple diagnoses and hospitalizations. He would benefit from continued inpatient psychiatric admission for further observation as his medications are restarted.     Plan:  - Continue Risperdal 1mg QHS for paranoia   - Continue Zoloft 50mg QD for anxiety and depression     5/24 Tolerating Risperdal well without issue, is amenable to Zoloft 50mg for anxiety. Overall fairly related, no gross psychosis  5/25 Tolerating Risperdal and Zoloft well, no acute concerns, not 
Patient is a 17y/o unemployed, single, undomiciled (staying in a drop in center in [facility])  male, with no PMH, PPH of PTSD, LAMBERT, DMDD, MDD, schizophrenia, and ADHD, with multiple hospitalizations at [facility] (9/16/19 - 9/1/19, 11/28/23 - 12/21/23, 12/27/23 - 12/31/23), most recently hospitalized January 2024, no hx of SA, no NSSIB, +Hx trauma, no substance use, who was admitted on voluntary status on 5/23/24 for paranoia and anxiety. He self-referred to the ED reporting worsening paranoid ideation and anxiety over the last few months in context of medication non-compliance and psychosocial stressors, including lack of support in the community and recent transition from a residential program to a drop in center.     On evaluation today, he denies any anxiety, with no notable paranoia, or responding to internal stimuli on exam. He continues to report sutained improvement with current medication and decrease in psychosocial stressors.     Plan:  - Continue Risperdal 1mg QHS for paranoia   - Continue Zoloft 50mg QD for anxiety and depression     5/24 Tolerating Risperdal well without issue, is amenable to Zoloft 50mg for anxiety. Overall fairly related, no gross psychosis  5/25 Tolerating Risperdal and Zoloft well, no acute concerns  5/26 Doing well, no concerns  5/27 no acute complaints, no side effects. 
Patient is a 17y/o unemployed, single, undomiciled (staying in a drop in center in [facility])  male, with no PMH, PPH of PTSD, LAMBERT, DMDD, MDD, schizophrenia, and ADHD, with multiple hospitalizations at [facility] (9/16/19 - 9/1/19, 11/28/23 - 12/21/23, 12/27/23 - 12/31/23), most recently hospitalized January 2024, no hx of SA, no NSSIB, +Hx trauma, no substance use, who was admitted on voluntary status on 5/23/24 for paranoia and anxiety. He self-referred to the ED reporting worsening paranoid ideation and anxiety over the last few months in context of medication non-compliance and psychosocial stressors, including lack of support in the community and recent transition from a residential program to a drop in center.     On evaluation today, he denies any anxiety, with no notable paranoia, or responding to internal stimuli on exam. He continues to report sutained improvement with current medication and decrease in psychosocial stressors.     Plan:  - Continue Risperdal 1mg QHS for paranoia   - Continue Zoloft 50mg QD for anxiety and depression     5/24 Tolerating Risperdal well without issue, is amenable to Zoloft 50mg for anxiety. Overall fairly related, no gross psychosis  5/25 Tolerating Risperdal and Zoloft well, no acute concerns  5/26 Doing well, no concerns  5/27 no acute complaints, no side effects.   5/28 Pt is responding well to current medications, no changes made. Discharge planning in progress. 
This is a 19y/o unemployed, single, undomiciled (staying in a drop in center in [facility])  male. No pertinent medical hx. Psychiatric hx of multiple hospitalizations at [facility] (9/16/19 - 9/1/19, 11/28/23 - 12/21/23, 12/27/23 - 12/31/23), most recently hospitalized January 2024. Diagnoses include PTSD, Anxiety, DMDD, MDD, Schizophrenia, ADHD. No hx of SA, no NSSIB. +Hx trauma, no substance use. Patient self-presented to [facility] ED with complaint of paranoid ideation and anxiety. Utox negative for all substances. Patient admitted to [facility] voluntary status.   He reports worsening paranoid ideation and anxiety over the last few months in context of medication non-compliance and psychosocial stressors. He has lack of support in the community and recently transitioned from a residential program to a drop in center. Will intend to start Risperdal and gauge need for antidepressant.      Risperdal 1mg qhs  Zoloft 50mg added to regimen    5/24 Tolerating Risperdal well without issue, is amenable to Zoloft 50mg for anxiety. Overall fairly related, no gross psychosis
Patient is a 19y/o unemployed, single, undomiciled (staying in a drop in center in [facility])  male, with no PMH, PPH of PTSD, LAMBERT, DMDD, MDD, schizophrenia, and ADHD, with multiple hospitalizations at [facility] (9/16/19 - 9/1/19, 11/28/23 - 12/21/23, 12/27/23 - 12/31/23), most recently hospitalized January 2024, no hx of SA, no NSSIB, +Hx trauma, no substance use, who was admitted on voluntary status on 5/23/24 for paranoia and anxiety. He self-referred to the ED reporting worsening paranoid ideation and anxiety over the last few months in context of medication non-compliance and psychosocial stressors, including lack of support in the community and recent transition from a residential program to a drop in center.     On evaluation today, he denies any anxiety, with no notable paranoia, or responding to internal stimuli on exam. He continues to report sutained improvement with current medication and decrease in psychosocial stressors.     Plan:  - Continue Risperdal 1mg QHS for paranoia   - Continue Zoloft 50mg QD for anxiety and depression     5/24 Tolerating Risperdal well without issue, is amenable to Zoloft 50mg for anxiety. Overall fairly related, no gross psychosis  5/25 Tolerating Risperdal and Zoloft well, no acute concerns  5/26 Doing well, no concerns  5/27 no acute complaints, no side effects.   5/28 Pt is responding well to current medications, no changes made. Discharge planning in progress. 
Patient is a 17y/o unemployed, single, undomiciled (staying in a drop in center in [facility])  male, with no PMH, PPH of PTSD, LAMBERT, DMDD, MDD, schizophrenia, and ADHD, with multiple hospitalizations at [facility] (9/16/19 - 9/1/19, 11/28/23 - 12/21/23, 12/27/23 - 12/31/23), most recently hospitalized January 2024, no hx of SA, no NSSIB, +Hx trauma, no substance use, who was admitted on voluntary status on 5/23/24 for paranoia and anxiety. He self-referred to the ED reporting worsening paranoid ideation and anxiety over the last few months in context of medication non-compliance and psychosocial stressors, including lack of support in the community and recent transition from a residential program to a drop in center.     On evaluation, patient is calm and cooperative. He continues to report resolution of symptoms. Although patient's rapid resolutions of symptoms are likely secondary to temporary removal of psychosocial stressors in the inpatient setting, patient does have an extensive psychiatric history with multiple diagnoses and hospitalizations. He would benefit from continued inpatient psychiatric admission for further observation as his medications are restarted.     Plan:  - Continue Risperdal 1mg QHS for paranoia   - Continue Zoloft 50mg QD for anxiety and depression     5/24 Tolerating Risperdal well without issue, is amenable to Zoloft 50mg for anxiety. Overall fairly related, no gross psychosis  5/25 Tolerating Risperdal and Zoloft well, no acute concerns  5/26 Doing well, no concerns
Patient is a 19y/o unemployed, single, undomiciled (staying in a drop in center in [facility])  male, with no PMH, PPH of PTSD, LAMBERT, DMDD, MDD, schizophrenia, and ADHD, with multiple hospitalizations at [facility] (9/16/19 - 9/1/19, 11/28/23 - 12/21/23, 12/27/23 - 12/31/23), most recently hospitalized January 2024, no hx of SA, no NSSIB, +Hx trauma, no substance use, who was admitted on voluntary status on 5/23/24 for paranoia and anxiety. He self-referred to the ED reporting worsening paranoid ideation and anxiety over the last few months in context of medication non-compliance and psychosocial stressors, including lack of support in the community and recent transition from a residential program to a drop in center.     On evaluation today, he denies any anxiety, with no notable paranoia, or responding to internal stimuli on exam. He continues to report sustained improvement with current medication and decrease in psychosocial stressors.     Plan:  - Continue Risperdal 1mg QHS for paranoia   - Continue Zoloft 50mg QD for anxiety and depression     5/24 Tolerating Risperdal well without issue, is amenable to Zoloft 50mg for anxiety. Overall fairly related, no gross psychosis  5/25 Tolerating Risperdal and Zoloft well, no acute concerns  5/26 Doing well, no concerns  5/27 no acute complaints, no side effects.   5/28 Pt is responding well to current medications, no changes made. Discharge planning in progress.   5/30 Patient psychiatrically stable, denies si/hi/avh or PI.

## 2024-05-30 NOTE — BH INPATIENT PSYCHIATRY PROGRESS NOTE - CURRENT MEDICATION
MEDICATIONS  (STANDING):  risperiDONE   Tablet 1 milliGRAM(s) Oral at bedtime  sertraline 50 milliGRAM(s) Oral daily    MEDICATIONS  (PRN):  acetaminophen     Tablet .. 650 milliGRAM(s) Oral every 6 hours PRN Moderate Pain (4 - 6)  aluminum hydroxide/magnesium hydroxide/simethicone Suspension 30 milliLiter(s) Oral every 6 hours PRN Dyspepsia  diphenhydrAMINE 50 milliGRAM(s) Oral every 6 hours PRN severe agitation  haloperidol     Tablet 5 milliGRAM(s) Oral every 6 hours PRN severe agitaiton  LORazepam     Tablet 2 milliGRAM(s) Oral every 6 hours PRN severe agitaiton  
MEDICATIONS  (STANDING):  risperiDONE   Tablet 1 milliGRAM(s) Oral at bedtime    MEDICATIONS  (PRN):  acetaminophen     Tablet .. 650 milliGRAM(s) Oral every 6 hours PRN Moderate Pain (4 - 6)  aluminum hydroxide/magnesium hydroxide/simethicone Suspension 30 milliLiter(s) Oral every 6 hours PRN Dyspepsia  diphenhydrAMINE 50 milliGRAM(s) Oral every 6 hours PRN severe agitation  haloperidol     Tablet 5 milliGRAM(s) Oral every 6 hours PRN severe agitaiton  LORazepam     Tablet 2 milliGRAM(s) Oral every 6 hours PRN severe agitaiton  
MEDICATIONS  (STANDING):  risperiDONE   Tablet 1 milliGRAM(s) Oral at bedtime  sertraline 50 milliGRAM(s) Oral daily    MEDICATIONS  (PRN):  acetaminophen     Tablet .. 650 milliGRAM(s) Oral every 6 hours PRN Moderate Pain (4 - 6)  aluminum hydroxide/magnesium hydroxide/simethicone Suspension 30 milliLiter(s) Oral every 6 hours PRN Dyspepsia  diphenhydrAMINE 50 milliGRAM(s) Oral every 6 hours PRN severe agitation  haloperidol     Tablet 5 milliGRAM(s) Oral every 6 hours PRN severe agitaiton  LORazepam     Tablet 2 milliGRAM(s) Oral every 6 hours PRN severe agitaiton  
MEDICATIONS  (STANDING):  risperiDONE   Tablet 1 milliGRAM(s) Oral at bedtime  sertraline 50 milliGRAM(s) Oral daily    MEDICATIONS  (PRN):  acetaminophen     Tablet .. 650 milliGRAM(s) Oral every 6 hours PRN Moderate Pain (4 - 6)  aluminum hydroxide/magnesium hydroxide/simethicone Suspension 30 milliLiter(s) Oral every 6 hours PRN Dyspepsia  diphenhydrAMINE 50 milliGRAM(s) Oral every 6 hours PRN severe agitation  haloperidol     Tablet 5 milliGRAM(s) Oral every 6 hours PRN severe agitaiton  LORazepam     Tablet 2 milliGRAM(s) Oral every 6 hours PRN severe agitaiton

## 2024-05-30 NOTE — BH INPATIENT PSYCHIATRY PROGRESS NOTE - NSBHATTESTAPPBILLTIME_PSY_A_CORE
Consult - Cardio
Consult- Cardiology
Consult- Cardiology
Provider to 
I attest my time as MAURA is greater than 50% of the total combined time spent on qualifying patient care activities. I have reviewed and verified the documentation.

## 2024-05-30 NOTE — BH INPATIENT PSYCHIATRY PROGRESS NOTE - NSBHMSEMOOD_PSY_A_CORE
What Type Of Note Output Would You Prefer (Optional)?: Standard Output
Is This A New Presentation, Or A Follow-Up?: Mole
Which Family Member (Optional)?: Father
Normal
Depressed

## 2024-05-30 NOTE — BH INPATIENT PSYCHIATRY PROGRESS NOTE - NSICDXBHTERTIARYDX_PSY_ALL_CORE
R/O Post traumatic stress disorder (PTSD)   F43.10  R/O Schizophrenia   F20.9  R/O MDD (major depressive disorder)   F32.9  R/O ADHD   F90.9  
R/O Post traumatic stress disorder (PTSD)   F43.10  R/O Schizophrenia   F20.9  R/O MDD (major depressive disorder)   F32.9  R/O ADHD   F90.9  
R/O Post traumatic stress disorder (PTSD)   F43.10  R/O Schizophrenia   F20.9  
R/O Post traumatic stress disorder (PTSD)   F43.10  R/O Schizophrenia   F20.9  R/O MDD (major depressive disorder)   F32.9  R/O ADHD   F90.9  

## 2024-05-30 NOTE — BH INPATIENT PSYCHIATRY PROGRESS NOTE - NSTXDEPRESGOAL_PSY_ALL_CORE
Report using a coping skill to overcome sadness and worry in order to socialize with peers daily

## 2024-05-30 NOTE — BH INPATIENT PSYCHIATRY PROGRESS NOTE - NSICDXBHSECONDARYDX_PSY_ALL_CORE
Adjustment disorder   F43.20  Anxiety   F41.9  
Adjustment disorder   F43.20  Anxiety   F41.9  
Anxiety   F41.9  
Adjustment disorder   F43.20  Anxiety   F41.9  

## 2024-05-30 NOTE — BH INPATIENT PSYCHIATRY PROGRESS NOTE - NSBHCONSULTIPREASON_PSY_A_CORE
danger to self; mental illness expected to respond to inpatient care
other reason
danger to self; mental illness expected to respond to inpatient care
danger to self; mental illness expected to respond to inpatient care
other reason

## 2024-05-30 NOTE — BH INPATIENT PSYCHIATRY PROGRESS NOTE - NSBHFUPINTERVALHXFT_PSY_A_CORE
Patient visible on the unit, seen attending groups and appropriately interacting with peers and staff. He is seen today in the tv area, cooperative and engaging on approach. Denying any concerns or complaints. No si/hi/avh or PI reported. Future oriented, he is anticipating discharge tomorrow to shelter.

## 2024-05-30 NOTE — BH INPATIENT PSYCHIATRY PROGRESS NOTE - PRN MEDS
MEDICATIONS  (PRN):  acetaminophen     Tablet .. 650 milliGRAM(s) Oral every 6 hours PRN Moderate Pain (4 - 6)  aluminum hydroxide/magnesium hydroxide/simethicone Suspension 30 milliLiter(s) Oral every 6 hours PRN Dyspepsia  diphenhydrAMINE 50 milliGRAM(s) Oral every 6 hours PRN severe agitation  haloperidol     Tablet 5 milliGRAM(s) Oral every 6 hours PRN severe agitaiton  LORazepam     Tablet 2 milliGRAM(s) Oral every 6 hours PRN severe agitaiton  

## 2024-05-30 NOTE — BH INPATIENT PSYCHIATRY PROGRESS NOTE - NSICDXBHPRIMARYDX_PSY_ALL_CORE
Anxiety   F41.9  
Adjustment disorder with mixed anxiety and depressed mood   F43.23  

## 2024-05-30 NOTE — BH INPATIENT PSYCHIATRY PROGRESS NOTE - NSBHFUPINTERVALCCFT_PSY_A_CORE
'My anxiety is much better'
I'm okay
I'm good
"I'm good." 
"I'm dong good"

## 2024-05-30 NOTE — BH INPATIENT PSYCHIATRY PROGRESS NOTE - NSBHMSETHTPROC_PSY_A_CORE
Linear/Normal reasoning
Linear
Linear/Normal reasoning

## 2024-05-30 NOTE — BH INPATIENT PSYCHIATRY PROGRESS NOTE - NSDCCRITERIA_PSY_ALL_CORE
Improvement in mood and sxs

## 2024-05-31 VITALS
TEMPERATURE: 100 F | DIASTOLIC BLOOD PRESSURE: 85 MMHG | OXYGEN SATURATION: 99 % | HEART RATE: 90 BPM | SYSTOLIC BLOOD PRESSURE: 125 MMHG

## 2024-05-31 PROCEDURE — 99285 EMERGENCY DEPT VISIT HI MDM: CPT | Mod: 25

## 2024-05-31 PROCEDURE — 36415 COLL VENOUS BLD VENIPUNCTURE: CPT

## 2024-05-31 PROCEDURE — 85025 COMPLETE CBC W/AUTO DIFF WBC: CPT

## 2024-05-31 PROCEDURE — 83036 HEMOGLOBIN GLYCOSYLATED A1C: CPT

## 2024-05-31 PROCEDURE — 80048 BASIC METABOLIC PNL TOTAL CA: CPT

## 2024-05-31 PROCEDURE — 87637 SARSCOV2&INF A&B&RSV AMP PRB: CPT

## 2024-05-31 PROCEDURE — 81001 URINALYSIS AUTO W/SCOPE: CPT

## 2024-05-31 PROCEDURE — 87635 SARS-COV-2 COVID-19 AMP PRB: CPT

## 2024-05-31 PROCEDURE — 99238 HOSP IP/OBS DSCHRG MGMT 30/<: CPT

## 2024-05-31 PROCEDURE — 84443 ASSAY THYROID STIM HORMONE: CPT

## 2024-05-31 PROCEDURE — 80061 LIPID PANEL: CPT

## 2024-05-31 PROCEDURE — 80307 DRUG TEST PRSMV CHEM ANLYZR: CPT

## 2024-05-31 RX ORDER — SERTRALINE 25 MG/1
1 TABLET, FILM COATED ORAL
Qty: 0 | Refills: 0 | DISCHARGE
Start: 2024-05-31

## 2024-05-31 RX ORDER — SERTRALINE 25 MG/1
1 TABLET, FILM COATED ORAL
Qty: 14 | Refills: 0
Start: 2024-05-31 | End: 2024-06-13

## 2024-05-31 RX ORDER — RISPERIDONE 4 MG/1
1 TABLET ORAL
Qty: 0 | Refills: 0 | DISCHARGE
Start: 2024-05-31

## 2024-05-31 RX ORDER — RISPERIDONE 4 MG/1
1 TABLET ORAL
Qty: 14 | Refills: 0
Start: 2024-05-31 | End: 2024-06-13

## 2024-05-31 RX ADMIN — SERTRALINE 50 MILLIGRAM(S): 25 TABLET, FILM COATED ORAL at 11:38

## 2024-05-31 NOTE — BH INPATIENT PSYCHIATRY DISCHARGE NOTE - HPI (INCLUDE ILLNESS QUALITY, SEVERITY, DURATION, TIMING, CONTEXT, MODIFYING FACTORS, ASSOCIATED SIGNS AND SYMPTOMS)
This is a 17y/o unemployed, single, undomiciled (staying in a drop in center in [facility])  male. No pertinent medical hx. Psychiatric hx of multiple hospitalizations at [facility] (9/16/19 - 9/1/19, 11/28/23 - 12/21/23, 12/27/23 - 12/31/23), most recently hospitalized January 2024. Diagnoses include PTSD, Anxiety, DMDD, MDD, Schizophrenia, ADHD. No hx of SA, no NSSIB. +Hx trauma, no substance use. Patient self-presented to [facility] ED with complaint of paranoid ideation and anxiety. Utox negative for all substances. Patient admitted to [facility] voluntary status.     Patient states that he had been in foster care for years until aging out of his residential program 4 months ago. They have been unable to get him into housing directly due to a lack of beds and he has been working with his SW Dev to find him placement. He has been staying in a drop in center x3 months. At the Center he has also been going to classes for his GED. He states that he has not taken his psychiatric medications in months since he was released from his residential program, but believes that he was taking risperdal, depakote and another medication most recently. He states that he has been feeling paranoid and anxious about social situations and yesterday when he was leaving the [place] to return to his drop in in [place] he decided to go to the hospital. He denies having SI, but endorses having intrusive thoughts at times. Denies thoughts of harming others. Denies avh, but reports having times when he feels disorganized and has difficulty understanding what is going on around him. He currently denies feelings of confusion or disorganization. He states that he was diagnosed with Schizophrenia this year at his most recent hospitalization.    Sleep has been poor due to sleeping on chairs, sofa at the drop in [place]. Appetite has been unremarkable. No drug/etoh use. Vapes nicotine daily.       Per ED report:  The patient is a poor historian. He is calm and cooperative on evaluation. He reports that he came to the ED because he has been feeling paranoid and restless. He states that he feels like he has to be on the defensive and people are watching him and looking at him. He states that he also has bad anxiety and was on the train earlier today and experienced SOB and restlessness. He was vague regarding his social history, states that he has been in foster care for a long time; was living in a group home in [place] (and had received some outpatient treatment at [facility]), but was being bullied so his  Dev is looking for new placement. He states that he was told to sleep at a drop in center in [place] ([facility]) until his new placement is possible. He did not have a phone number for Dev but provided his email ([email or web address]). States he has been sleeping adria a couch at the drop n center, because there is no bed and it is not an actual shelter. States he has heriberto doing this for 1-2 months. He reports history of trauma related to his childhood, and would not discuss this or the reasons why he was removed from his parents care. He stated that his parents live in [place] with his biological younger sisters. Reports feeling depressed, he is not on his medications and was previously on VPA and Risperdal. He states that he had suicidal ideation earlier when he was feeling paranoid an anxious. States that his heart was racing and it comes and goes. He denies current SI/HI. He states that he is having difficulty sleeping and feels restless. He denies auditory/visual hallucinations. Denies access to guns. Denies history of arrests/violence. States that he has no social supports and is not in touch with family. He does not receive SSI or benefits and has been getting food from [facility] in [place]. He denies substance abuse. Pt graduated from 11th grade, working on his GED. Allergies to peanuts and shellfish. Pt requesting inpatient admission, wants to restart medications.

## 2024-05-31 NOTE — BH DISCHARGE NOTE NURSING/SOCIAL WORK/PSYCH REHAB - PATIENT PORTAL LINK FT
You can access the FollowMyHealth Patient Portal offered by [facility] by registering at the following website: [email or web address]. By joining [employer]’s FollowMyHealth portal, you will also be able to view your health information using other applications (apps) compatible with our system.

## 2024-05-31 NOTE — BH INPATIENT PSYCHIATRY DISCHARGE NOTE - HOSPITAL COURSE
5/24 Tolerating Risperdal well without issue, is amenable to Zoloft 50mg for anxiety. Overall fairly related, no gross psychosis  5/25 Tolerating Risperdal and Zoloft well, no acute concerns  5/26 Doing well, no concerns  5/27 no acute complaints, no side effects.   5/28 Pt is responding well to current medications, no changes made. Discharge planning in progress.   5/30 Patient psychiatrically stable, denies si/hi/avh or PI. 5/24 Tolerating Risperdal well without issue, is amenable to Zoloft 50mg for anxiety. Overall fairly related, no gross psychosis  5/25 Tolerating Risperdal and Zoloft well, no acute concerns  5/26 Doing well, no concerns  5/27 no acute complaints, no side effects.   5/28 Pt is responding well to current medications, no changes made. Discharge planning in progress.   5/30 Patient psychiatrically stable, denies si/hi/avh or PI. Anticipating discharge tomorrow

## 2024-05-31 NOTE — BH INPATIENT PSYCHIATRY DISCHARGE NOTE - OTHER PAST PSYCHIATRIC HISTORY (INCLUDE DETAILS REGARDING ONSET, COURSE OF ILLNESS, INPATIENT/OUTPATIENT TREATMENT)
Per chart: Jose Alberto is a 18 year old, currently in foster care, multiple past IPP (last known 1/5 - 1//2024 for unspecified psychosis at an [facility] of state hospital), multiple hospitalizations at [facility] (12/27/23 - 12/31/23; 11/28/23 - 12/21/23; 9/16/19 - 9/1/19), per [facility] diagnoses of PTSD< anxiety, unspecified psychosis, unspecified anxiety, DMDD, major depressive disorder, schizophrenia, ADHD,  denies legal history, denies history of suicide attempts, history of trauma, no substance use, presented to the ED with paranoia and anxiety.

## 2024-05-31 NOTE — BH INPATIENT PSYCHIATRY DISCHARGE NOTE - NSBHMETABOLIC_PSY_ALL_CORE_FT
BMI: BMI (kg/m2): 30 (05-23-24 @ 05:10)  HbA1c: A1C with Estimated Average Glucose Result: 5.4 % (05-24-24 @ 05:30)    Glucose:   BP: 125/85 (05-31-24 @ 09:46) (100/75 - 140/90)Vital Signs Last 24 Hrs  T(C): 37.6 (05-31-24 @ 09:46), Max: 37.6 (05-31-24 @ 09:46)  T(F): 99.6 (05-31-24 @ 09:46), Max: 99.6 (05-31-24 @ 09:46)  HR: 90 (05-31-24 @ 09:46) (81 - 103)  BP: 125/85 (05-31-24 @ 09:46) (125/85 - 134/84)  BP(mean): --  RR: 18 (05-30-24 @ 16:46) (18 - 18)  SpO2: 99% (05-31-24 @ 09:46) (98% - 99%)      Lipid Panel: Date/Time: 05-24-24 @ 05:30  Cholesterol, Serum: 271  LDL Cholesterol Calculated: 188  HDL Cholesterol, Serum: 67  Total Cholesterol/HDL Ration Measurement: --  Triglycerides, Serum: 94

## 2024-05-31 NOTE — BH DISCHARGE NOTE NURSING/SOCIAL WORK/PSYCH REHAB - NSCDUDCCRISIS_PSY_A_CORE
[facility] Well  1 [phone]) [facility] ([phone])  Text "WELL" to 72671  Website: www.[employer]/.National Suicide Prevention Lifeline 1 (414) 549-4215/.  [employer]  1 (172) LIFENET (041-8005)/988 Suicide and Crisis Lifeline

## 2024-05-31 NOTE — BH DISCHARGE NOTE NURSING/SOCIAL WORK/PSYCH REHAB - NSTOBACCOOTHER_PSY_ALL_CORE_FT
Call [facility] Smokers' Quitline at [phone]-LW-QUITS ([phone]) or visit www.[employer].

## 2024-05-31 NOTE — BH INPATIENT PSYCHIATRY DISCHARGE NOTE - NSDCMRMEDTOKEN_GEN_ALL_CORE_FT
risperiDONE 1 mg oral tablet: 1 tab(s) orally once a day (at bedtime)  sertraline 50 mg oral tablet: 1 tab(s) orally once a day

## 2024-05-31 NOTE — BH INPATIENT PSYCHIATRY DISCHARGE NOTE - NSBHASSESSSUMMFT_PSY_ALL_CORE
Patient is a 17y/o unemployed, single, undomiciled (staying in a drop in center in [facility])  male, with no PMH, PPH of PTSD, LAMBERT, DMDD, MDD, schizophrenia, and ADHD, with multiple hospitalizations at [facility] (9/16/19 - 9/1/19, 11/28/23 - 12/21/23, 12/27/23 - 12/31/23), most recently hospitalized January 2024, no hx of SA, no NSSIB, +Hx trauma, no substance use, who was admitted on voluntary status on 5/23/24 for paranoia and anxiety. He self-referred to the ED reporting worsening paranoid ideation and anxiety over the last few months in context of medication non-compliance and psychosocial stressors, including lack of support in the community and recent transition from a residential program to a drop in center.     On evaluation today, he denies any anxiety, with no notable paranoia, or responding to internal stimuli on exam. He continues to report sustained improvement with current medication and decrease in psychosocial stressors.     Plan:  - Continue Risperdal 1mg QHS for paranoia   - Continue Zoloft 50mg QD for anxiety and depression

## 2024-05-31 NOTE — BH DISCHARGE NOTE NURSING/SOCIAL WORK/PSYCH REHAB - NSDCADDINFO1FT_PSY_ALL_CORE
You are scheduled to attend an IN-PERSON mental health intake appointment with [facility] on 6/3/24 at 9:30AM. Please arrive 15 minutes early and bring your ID + health insurance card.

## 2024-05-31 NOTE — BH INPATIENT PSYCHIATRY DISCHARGE NOTE - NSDCCPCAREPLAN_GEN_ALL_CORE_FT
PRINCIPAL DISCHARGE DIAGNOSIS  Diagnosis: Anxiety  Assessment and Plan of Treatment: Continue current medication regimen and follow up with aftercare appointments      SECONDARY DISCHARGE DIAGNOSES  Diagnosis: Adjustment disorder with mixed anxiety and depressed mood  Assessment and Plan of Treatment: Continue current medication regimen and follow up with aftercare appointments    Diagnosis: Post traumatic stress disorder (PTSD)  Assessment and Plan of Treatment: Continue current medication regimen and follow up with aftercare appointments    Diagnosis: Schizophrenia  Assessment and Plan of Treatment: Continue current medication regimen and follow up with aftercare appointments

## 2024-05-31 NOTE — BH INPATIENT PSYCHIATRY DISCHARGE NOTE - NSBHFUPINTERVALHXFT_PSY_A_CORE
Patient visible on the unit, seen reading newspaper in tv area. Calm and cooperative on approach, shares that he is anticipating discharge today. Denies si/hi/avh or PI. Future oriented, states that he is hoping to get a job as a . Is [facility].

## 2024-05-31 NOTE — BH DISCHARGE NOTE NURSING/SOCIAL WORK/PSYCH REHAB - NSDCOTHERNAME_GEN_ALL_CORE
[facility]-In [facility]  [address], 2nd floor, [facility] [address]

## 2024-05-31 NOTE — BH INPATIENT PSYCHIATRY DISCHARGE NOTE - REASON FOR ADMISSION
This is a 17y/o unemployed, single, undomiciled (staying in a drop in center in [facility])  male. No pertinent medical hx. Psychiatric hx of  PTSD, Anxiety, DMDD, MDD, Schizophrenia, ADHD. No hx of SA, no NSSIB. +Hx trauma, no substance use. Patient self-presented to [facility] ED with complaint of paranoid ideation and anxiety. Utox negative for all substances. Patient admitted to [facility] voluntary status.

## 2024-06-03 NOTE — BH SOCIAL WORK CONFIRMATION FOLLOW UP NOTE - NSCOMMENTS_PSY_ALL_CORE
Caring Call: Chart reviewed on 6/3/24. As the patient was a "Low" Suicide Risk; no Caring Call was required by the [employer] Team over the weekend following the patient's hospital discharge on 5/31/24. SW to remain available.     Linkage Call: This SW called [facility] on 6/3/24 at 4:27PM and left a VM asking for a call back to verify if the patient successfully attended his appointment on 6/3/24 at 9:30AM. SW to remain available.     [facility]  03-Jun-2024 09:30  [address] [place], [place] [address] [phone]  Mental Health Treatment / [facility] ([facility])

## 2024-06-04 DIAGNOSIS — F20.9 SCHIZOPHRENIA, UNSPECIFIED: ICD-10-CM

## 2024-06-04 DIAGNOSIS — F43.23 ADJUSTMENT DISORDER WITH MIXED ANXIETY AND DEPRESSED MOOD: ICD-10-CM

## 2024-06-04 DIAGNOSIS — F41.9 ANXIETY DISORDER, UNSPECIFIED: ICD-10-CM

## 2024-06-04 DIAGNOSIS — F32.9 MAJOR DEPRESSIVE DISORDER, SINGLE EPISODE, UNSPECIFIED: ICD-10-CM

## 2024-06-04 DIAGNOSIS — R45.851 SUICIDAL IDEATIONS: ICD-10-CM

## 2024-06-04 DIAGNOSIS — Z91.010 ALLERGY TO PEANUTS: ICD-10-CM

## 2024-06-04 DIAGNOSIS — F90.9 ATTENTION-DEFICIT HYPERACTIVITY DISORDER, UNSPECIFIED TYPE: ICD-10-CM

## 2024-06-04 DIAGNOSIS — F43.10 POST-TRAUMATIC STRESS DISORDER, UNSPECIFIED: ICD-10-CM

## 2024-06-04 DIAGNOSIS — F17.290 NICOTINE DEPENDENCE, OTHER TOBACCO PRODUCT, UNCOMPLICATED: ICD-10-CM

## 2024-06-04 DIAGNOSIS — Z91.013 ALLERGY TO SEAFOOD: ICD-10-CM

## 2024-06-04 DIAGNOSIS — Z91.148 PATIENT'S OTHER NONCOMPLIANCE WITH MEDICATION REGIMEN FOR OTHER REASON: ICD-10-CM

## 2024-06-04 DIAGNOSIS — Z59.01 SHELTERED HOMELESSNESS: ICD-10-CM

## 2024-06-04 DIAGNOSIS — Z11.52 ENCOUNTER FOR SCREENING FOR COVID-19: ICD-10-CM

## 2024-06-04 SDOH — ECONOMIC STABILITY - HOUSING INSECURITY: SHELTERED HOMELESSNESS: Z59.01

## 2024-06-13 NOTE — BH INPATIENT PSYCHIATRY DISCHARGE NOTE - NSBHMSEATTEN_PSY_A_CORE

## 2024-11-19 NOTE — BH TREATMENT PLAN - NSTXANXDATEEST_PSY_ALL_CORE
23-May-2024
23-May-2024
PAST MEDICAL HISTORY:  Asthma, unspecified asthma severity, unspecified whether complicated, unspecified whether persistent     Below knee amputation status, right     Factor deficiency, coagulation     Omental infarction

## 2025-04-16 NOTE — ED ADULT NURSE NOTE - CHIEF COMPLAINT
He may ease into higher intensity workouts over the next week as tolerated.  Begin competition by next week, holding off this week.   Focus on muscle pliability and active stretching.   Follow up with us as needed.   The patient is a 18y Male complaining of psychiatric evaluation.